# Patient Record
Sex: FEMALE | Race: WHITE | NOT HISPANIC OR LATINO
[De-identification: names, ages, dates, MRNs, and addresses within clinical notes are randomized per-mention and may not be internally consistent; named-entity substitution may affect disease eponyms.]

---

## 2018-12-31 PROBLEM — Z00.00 ENCOUNTER FOR PREVENTIVE HEALTH EXAMINATION: Status: ACTIVE | Noted: 2018-12-31

## 2019-01-09 ENCOUNTER — APPOINTMENT (OUTPATIENT)
Dept: MAMMOGRAPHY | Facility: CLINIC | Age: 64
End: 2019-01-09
Payer: COMMERCIAL

## 2019-01-09 ENCOUNTER — APPOINTMENT (OUTPATIENT)
Dept: ULTRASOUND IMAGING | Facility: CLINIC | Age: 64
End: 2019-01-09
Payer: COMMERCIAL

## 2019-01-09 ENCOUNTER — OUTPATIENT (OUTPATIENT)
Dept: OUTPATIENT SERVICES | Facility: HOSPITAL | Age: 64
LOS: 1 days | End: 2019-01-09
Payer: COMMERCIAL

## 2019-01-09 DIAGNOSIS — Z00.8 ENCOUNTER FOR OTHER GENERAL EXAMINATION: ICD-10-CM

## 2019-01-09 PROCEDURE — 77063 BREAST TOMOSYNTHESIS BI: CPT | Mod: 26

## 2019-01-09 PROCEDURE — 77067 SCR MAMMO BI INCL CAD: CPT | Mod: 26

## 2019-01-09 PROCEDURE — 76641 ULTRASOUND BREAST COMPLETE: CPT | Mod: 26,50

## 2019-01-09 PROCEDURE — 77063 BREAST TOMOSYNTHESIS BI: CPT

## 2019-01-09 PROCEDURE — 76641 ULTRASOUND BREAST COMPLETE: CPT

## 2019-01-09 PROCEDURE — 77067 SCR MAMMO BI INCL CAD: CPT

## 2021-11-30 ENCOUNTER — APPOINTMENT (OUTPATIENT)
Dept: RADIOLOGY | Facility: CLINIC | Age: 66
End: 2021-11-30
Payer: COMMERCIAL

## 2021-11-30 ENCOUNTER — OUTPATIENT (OUTPATIENT)
Dept: OUTPATIENT SERVICES | Facility: HOSPITAL | Age: 66
LOS: 1 days | End: 2021-11-30
Payer: COMMERCIAL

## 2021-11-30 ENCOUNTER — APPOINTMENT (OUTPATIENT)
Dept: MAMMOGRAPHY | Facility: CLINIC | Age: 66
End: 2021-11-30
Payer: COMMERCIAL

## 2021-11-30 ENCOUNTER — APPOINTMENT (OUTPATIENT)
Dept: ULTRASOUND IMAGING | Facility: CLINIC | Age: 66
End: 2021-11-30
Payer: COMMERCIAL

## 2021-11-30 DIAGNOSIS — Z00.00 ENCOUNTER FOR GENERAL ADULT MEDICAL EXAMINATION WITHOUT ABNORMAL FINDINGS: ICD-10-CM

## 2021-11-30 PROCEDURE — 77067 SCR MAMMO BI INCL CAD: CPT | Mod: 26

## 2021-11-30 PROCEDURE — 77063 BREAST TOMOSYNTHESIS BI: CPT

## 2021-11-30 PROCEDURE — 76641 ULTRASOUND BREAST COMPLETE: CPT | Mod: 26,RT

## 2021-11-30 PROCEDURE — 77080 DXA BONE DENSITY AXIAL: CPT

## 2021-11-30 PROCEDURE — 77063 BREAST TOMOSYNTHESIS BI: CPT | Mod: 26

## 2021-11-30 PROCEDURE — 77080 DXA BONE DENSITY AXIAL: CPT | Mod: 26

## 2021-11-30 PROCEDURE — 77067 SCR MAMMO BI INCL CAD: CPT

## 2021-11-30 PROCEDURE — 76641 ULTRASOUND BREAST COMPLETE: CPT

## 2022-02-24 ENCOUNTER — APPOINTMENT (OUTPATIENT)
Dept: ULTRASOUND IMAGING | Facility: CLINIC | Age: 67
End: 2022-02-24
Payer: MEDICARE

## 2022-02-24 PROCEDURE — 76536 US EXAM OF HEAD AND NECK: CPT

## 2022-03-15 ENCOUNTER — OUTPATIENT (OUTPATIENT)
Dept: OUTPATIENT SERVICES | Facility: HOSPITAL | Age: 67
LOS: 1 days | End: 2022-03-15
Payer: SELF-PAY

## 2022-03-15 DIAGNOSIS — E78.5 HYPERLIPIDEMIA, UNSPECIFIED: ICD-10-CM

## 2022-03-15 PROCEDURE — 75571 CT HRT W/O DYE W/CA TEST: CPT

## 2022-03-21 ENCOUNTER — APPOINTMENT (OUTPATIENT)
Dept: SURGERY | Facility: CLINIC | Age: 67
End: 2022-03-21
Payer: MEDICARE

## 2022-03-21 VITALS
BODY MASS INDEX: 23.92 KG/M2 | WEIGHT: 130 LBS | HEART RATE: 89 BPM | HEIGHT: 62 IN | SYSTOLIC BLOOD PRESSURE: 192 MMHG | DIASTOLIC BLOOD PRESSURE: 111 MMHG

## 2022-03-21 DIAGNOSIS — Z86.012 PERSONAL HISTORY OF BENIGN CARCINOID TUMOR: ICD-10-CM

## 2022-03-21 LAB
25(OH)D3 SERPL-MCNC: 32.2 NG/ML
CALCIUM SERPL-MCNC: 10.8 MG/DL
CALCIUM SERPL-MCNC: 10.8 MG/DL
PARATHYROID HORMONE INTACT: 59 PG/ML
T3 SERPL-MCNC: 140 NG/DL
T4 FREE SERPL-MCNC: 1.2 NG/DL
T4 SERPL-MCNC: 8.4 UG/DL
THYROGLOB AB SERPL-ACNC: <20 IU/ML
THYROPEROXIDASE AB SERPL IA-ACNC: 24 IU/ML
TSH SERPL-ACNC: 1.91 UIU/ML

## 2022-03-21 PROCEDURE — 99204 OFFICE O/P NEW MOD 45 MIN: CPT

## 2022-03-21 NOTE — PHYSICAL EXAM
[Midline] : located in midline position [Normal] : orientation to person, place, and time: normal [de-identified] : as above [de-identified] : Extremities: GONCALVES x 4.   Skin: No obvious skin lesions.   Voice: clear

## 2022-03-22 ENCOUNTER — NON-APPOINTMENT (OUTPATIENT)
Age: 67
End: 2022-03-22

## 2022-03-23 ENCOUNTER — APPOINTMENT (OUTPATIENT)
Dept: ULTRASOUND IMAGING | Facility: CLINIC | Age: 67
End: 2022-03-23
Payer: MEDICARE

## 2022-03-23 LAB — CA-I SERPL-SCNC: 5.6 MG/DL

## 2022-03-23 PROCEDURE — 76536 US EXAM OF HEAD AND NECK: CPT

## 2022-03-24 ENCOUNTER — NON-APPOINTMENT (OUTPATIENT)
Age: 67
End: 2022-03-24

## 2022-03-28 ENCOUNTER — NON-APPOINTMENT (OUTPATIENT)
Age: 67
End: 2022-03-28

## 2022-03-29 LAB
CAU: 7 MG/DL
CREAT 24H UR-MCNC: 0.9 G/24 H
CREAT ?TM UR-MCNC: 32 MG/DL
PROT ?TM UR-MCNC: 24 HR
SPECIMEN VOL 24H UR: 200 MG/24 H
SPECIMEN VOL 24H UR: 2850 ML

## 2022-04-08 DIAGNOSIS — R59.0 LOCALIZED ENLARGED LYMPH NODES: ICD-10-CM

## 2022-04-08 DIAGNOSIS — Z86.39 PERSONAL HISTORY OF OTHER ENDOCRINE, NUTRITIONAL AND METABOLIC DISEASE: ICD-10-CM

## 2022-04-14 ENCOUNTER — APPOINTMENT (OUTPATIENT)
Dept: SURGERY | Facility: CLINIC | Age: 67
End: 2022-04-14
Payer: MEDICARE

## 2022-04-14 PROCEDURE — 99213 OFFICE O/P EST LOW 20 MIN: CPT

## 2022-04-14 NOTE — PHYSICAL EXAM
[Midline] : located in midline position [Normal] : orientation to person, place, and time: normal [de-identified] : as above [de-identified] : Extremities: GONCALVES x 4.   Skin: No obvious skin lesions.   Voice: clear

## 2022-05-02 ENCOUNTER — APPOINTMENT (OUTPATIENT)
Dept: RADIOLOGY | Facility: CLINIC | Age: 67
End: 2022-05-02
Payer: MEDICARE

## 2022-05-02 PROCEDURE — 77080 DXA BONE DENSITY AXIAL: CPT

## 2022-06-30 ENCOUNTER — APPOINTMENT (OUTPATIENT)
Dept: SURGERY | Facility: CLINIC | Age: 67
End: 2022-06-30

## 2022-06-30 PROCEDURE — 99441: CPT

## 2022-08-17 ENCOUNTER — NON-APPOINTMENT (OUTPATIENT)
Age: 67
End: 2022-08-17

## 2022-09-28 ENCOUNTER — NON-APPOINTMENT (OUTPATIENT)
Age: 67
End: 2022-09-28

## 2022-09-29 ENCOUNTER — LABORATORY RESULT (OUTPATIENT)
Age: 67
End: 2022-09-29

## 2022-09-29 ENCOUNTER — APPOINTMENT (OUTPATIENT)
Dept: ULTRASOUND IMAGING | Facility: CLINIC | Age: 67
End: 2022-09-29

## 2022-09-29 PROCEDURE — 76536 US EXAM OF HEAD AND NECK: CPT

## 2022-10-03 ENCOUNTER — NON-APPOINTMENT (OUTPATIENT)
Age: 67
End: 2022-10-03

## 2022-10-12 VITALS
HEIGHT: 64 IN | HEART RATE: 63 BPM | SYSTOLIC BLOOD PRESSURE: 115 MMHG | DIASTOLIC BLOOD PRESSURE: 71 MMHG | WEIGHT: 195 LBS | BODY MASS INDEX: 33.29 KG/M2

## 2022-10-13 ENCOUNTER — APPOINTMENT (OUTPATIENT)
Dept: SURGERY | Facility: CLINIC | Age: 67
End: 2022-10-13

## 2022-10-13 PROCEDURE — 99213 OFFICE O/P EST LOW 20 MIN: CPT

## 2022-10-13 NOTE — PHYSICAL EXAM
[Midline] : located in midline position [Normal] : orientation to person, place, and time: normal [de-identified] : Extremities: GONCALVES x 4.   Skin: No obvious skin lesions.   Voice: clear

## 2022-11-08 ENCOUNTER — OUTPATIENT (OUTPATIENT)
Dept: OUTPATIENT SERVICES | Facility: HOSPITAL | Age: 67
LOS: 1 days | End: 2022-11-08
Payer: MEDICARE

## 2022-11-08 ENCOUNTER — APPOINTMENT (OUTPATIENT)
Dept: NUCLEAR MEDICINE | Facility: IMAGING CENTER | Age: 67
End: 2022-11-08

## 2022-11-08 DIAGNOSIS — Z00.8 ENCOUNTER FOR OTHER GENERAL EXAMINATION: ICD-10-CM

## 2022-11-08 DIAGNOSIS — E21.3 HYPERPARATHYROIDISM, UNSPECIFIED: ICD-10-CM

## 2022-11-08 PROCEDURE — 78072 PARATHYRD PLANAR W/SPECT&CT: CPT | Mod: 26

## 2022-11-08 PROCEDURE — 78072 PARATHYRD PLANAR W/SPECT&CT: CPT

## 2022-11-08 PROCEDURE — A9500: CPT

## 2022-11-08 PROCEDURE — A9512: CPT

## 2022-11-30 ENCOUNTER — APPOINTMENT (OUTPATIENT)
Dept: SURGERY | Facility: CLINIC | Age: 67
End: 2022-11-30

## 2022-11-30 PROCEDURE — 99214 OFFICE O/P EST MOD 30 MIN: CPT

## 2022-11-30 NOTE — PHYSICAL EXAM
[Midline] : located in midline position [Normal] : orientation to person, place, and time: normal [de-identified] : Extremities: GONCALVES x 4.   Skin: No obvious skin lesions.   Voice: clear

## 2023-01-13 ENCOUNTER — OUTPATIENT (OUTPATIENT)
Dept: OUTPATIENT SERVICES | Facility: HOSPITAL | Age: 68
LOS: 1 days | End: 2023-01-13
Payer: MEDICARE

## 2023-01-13 VITALS
TEMPERATURE: 98 F | HEART RATE: 90 BPM | SYSTOLIC BLOOD PRESSURE: 150 MMHG | RESPIRATION RATE: 16 BRPM | WEIGHT: 128.09 LBS | OXYGEN SATURATION: 99 % | DIASTOLIC BLOOD PRESSURE: 83 MMHG | HEIGHT: 62 IN

## 2023-01-13 DIAGNOSIS — Z90.710 ACQUIRED ABSENCE OF BOTH CERVIX AND UTERUS: Chronic | ICD-10-CM

## 2023-01-13 DIAGNOSIS — E21.3 HYPERPARATHYROIDISM, UNSPECIFIED: ICD-10-CM

## 2023-01-13 DIAGNOSIS — Z90.49 ACQUIRED ABSENCE OF OTHER SPECIFIED PARTS OF DIGESTIVE TRACT: Chronic | ICD-10-CM

## 2023-01-13 DIAGNOSIS — Z01.818 ENCOUNTER FOR OTHER PREPROCEDURAL EXAMINATION: ICD-10-CM

## 2023-01-13 DIAGNOSIS — Z86.39 PERSONAL HISTORY OF OTHER ENDOCRINE, NUTRITIONAL AND METABOLIC DISEASE: ICD-10-CM

## 2023-01-13 LAB
ALBUMIN SERPL ELPH-MCNC: 3.8 G/DL — SIGNIFICANT CHANGE UP (ref 3.3–5)
ALP SERPL-CCNC: 139 U/L — HIGH (ref 40–120)
ALT FLD-CCNC: 27 U/L — SIGNIFICANT CHANGE UP (ref 12–78)
ANION GAP SERPL CALC-SCNC: 7 MMOL/L — SIGNIFICANT CHANGE UP (ref 5–17)
APTT BLD: 29.2 SEC — SIGNIFICANT CHANGE UP (ref 27.5–35.5)
AST SERPL-CCNC: 23 U/L — SIGNIFICANT CHANGE UP (ref 15–37)
BILIRUB SERPL-MCNC: 0.3 MG/DL — SIGNIFICANT CHANGE UP (ref 0.2–1.2)
BUN SERPL-MCNC: 19 MG/DL — SIGNIFICANT CHANGE UP (ref 7–23)
CALCIUM SERPL-MCNC: 10.6 MG/DL — HIGH (ref 8.5–10.1)
CHLORIDE SERPL-SCNC: 106 MMOL/L — SIGNIFICANT CHANGE UP (ref 96–108)
CO2 SERPL-SCNC: 28 MMOL/L — SIGNIFICANT CHANGE UP (ref 22–31)
CREAT SERPL-MCNC: 0.75 MG/DL — SIGNIFICANT CHANGE UP (ref 0.5–1.3)
EGFR: 87 ML/MIN/1.73M2 — SIGNIFICANT CHANGE UP
GLUCOSE SERPL-MCNC: 98 MG/DL — SIGNIFICANT CHANGE UP (ref 70–99)
HCT VFR BLD CALC: 44.5 % — SIGNIFICANT CHANGE UP (ref 34.5–45)
HGB BLD-MCNC: 14.6 G/DL — SIGNIFICANT CHANGE UP (ref 11.5–15.5)
INR BLD: 0.97 RATIO — SIGNIFICANT CHANGE UP (ref 0.88–1.16)
MCHC RBC-ENTMCNC: 30.7 PG — SIGNIFICANT CHANGE UP (ref 27–34)
MCHC RBC-ENTMCNC: 32.8 GM/DL — SIGNIFICANT CHANGE UP (ref 32–36)
MCV RBC AUTO: 93.5 FL — SIGNIFICANT CHANGE UP (ref 80–100)
NRBC # BLD: 0 /100 WBCS — SIGNIFICANT CHANGE UP (ref 0–0)
PLATELET # BLD AUTO: 287 K/UL — SIGNIFICANT CHANGE UP (ref 150–400)
POTASSIUM SERPL-MCNC: 4.3 MMOL/L — SIGNIFICANT CHANGE UP (ref 3.5–5.3)
POTASSIUM SERPL-SCNC: 4.3 MMOL/L — SIGNIFICANT CHANGE UP (ref 3.5–5.3)
PROT SERPL-MCNC: 8.2 G/DL — SIGNIFICANT CHANGE UP (ref 6–8.3)
PROTHROM AB SERPL-ACNC: 11.3 SEC — SIGNIFICANT CHANGE UP (ref 10.5–13.4)
RBC # BLD: 4.76 M/UL — SIGNIFICANT CHANGE UP (ref 3.8–5.2)
RBC # FLD: 12.9 % — SIGNIFICANT CHANGE UP (ref 10.3–14.5)
SODIUM SERPL-SCNC: 141 MMOL/L — SIGNIFICANT CHANGE UP (ref 135–145)
WBC # BLD: 6.81 K/UL — SIGNIFICANT CHANGE UP (ref 3.8–10.5)
WBC # FLD AUTO: 6.81 K/UL — SIGNIFICANT CHANGE UP (ref 3.8–10.5)

## 2023-01-13 PROCEDURE — 93005 ELECTROCARDIOGRAM TRACING: CPT

## 2023-01-13 PROCEDURE — 85027 COMPLETE CBC AUTOMATED: CPT

## 2023-01-13 PROCEDURE — 93010 ELECTROCARDIOGRAM REPORT: CPT

## 2023-01-13 PROCEDURE — 36415 COLL VENOUS BLD VENIPUNCTURE: CPT

## 2023-01-13 PROCEDURE — G0463: CPT

## 2023-01-13 PROCEDURE — 85610 PROTHROMBIN TIME: CPT

## 2023-01-13 PROCEDURE — 86850 RBC ANTIBODY SCREEN: CPT

## 2023-01-13 PROCEDURE — 85730 THROMBOPLASTIN TIME PARTIAL: CPT

## 2023-01-13 PROCEDURE — 86901 BLOOD TYPING SEROLOGIC RH(D): CPT

## 2023-01-13 PROCEDURE — 80053 COMPREHEN METABOLIC PANEL: CPT

## 2023-01-13 PROCEDURE — 86900 BLOOD TYPING SEROLOGIC ABO: CPT

## 2023-01-13 NOTE — H&P PST ADULT - PROBLEM SELECTOR PLAN 1
check labs cbc cmp pt ptt type and screen  ekg  medical clearance  hibiclens x 3 days with written and verbal instructions  patient has an appointment at [facility] for covid test 48 hours before surgery  patient is aware that she may be monitored for sleep apnea for 3-6 hours post op  patient to take Amlodipine Olmesartan with a tiny sip water the morning of surgery  7 days before surgery stop the fish oil multivitamin Joint health aspirin and biotin  patient verbalizes understanding of instructions

## 2023-01-13 NOTE — H&P PST ADULT - ASSESSMENT
66 yo female with hx of Colon Carcinoid Hypertension Vitamin D Deficiency Hypercalcemia Hyperparathyroidism Multiple Thyroid Nodules Osteopenia scheduled for Parathyroidectomy with PTH Assay on 1/27/23 with Dr Pruitt.

## 2023-01-13 NOTE — H&P PST ADULT - NSICDXPASTMEDICALHX_GEN_ALL_CORE_FT
PAST MEDICAL HISTORY:  Carcinoid tumor of colon     H/O hyperparathyroidism     Hypertension     Osteopenia      PAST MEDICAL HISTORY:  Carcinoid tumor of colon     Cervical lymphadenopathy     H/O hyperparathyroidism     H/O vitamin D deficiency     Hypertension     Osteopenia

## 2023-01-13 NOTE — H&P PST ADULT - NSANTHOSAYNRD_GEN_A_CORE
No. NIYA screening performed.  STOP BANG Legend: 0-2 = LOW Risk; 3-4 = INTERMEDIATE Risk; 5-8 = HIGH Risk

## 2023-01-13 NOTE — H&P PST ADULT - HISTORY OF PRESENT ILLNESS
68 yo female with   Patient had hypercalcemia for the past year.  Patient has been monitored for one year and calcium stays elevated and developed Osteopenia.  66 yo female with hx of Colon Carcinoid Hypertension Vitamin D Deficiency Hypercalcemia Hyperparathyroidism Multiple Thyroid Nodules Osteopenia scheduled for Parathyroidectomy with PTH Assay on 1/27/23 with Dr Pruitt.  Patient has  had hypercalcemia for the past year, being monitored and calcium has increased.  Patient  developed Osteopenia.

## 2023-01-13 NOTE — H&P PST ADULT - PRIMARY CARE PROVIDER
[de-identified] : Presents for F/U from the ER.  Reviewed all ER documentation - pt had a sudden onset of generalized "shaking" with lethargy and since then is "not himself" - pt states has no appetite, no "drive," sleeping excessively.  Pt denies weakness on one side or another, just general weakness and fatigue, although states having difficulty sleeping.  Wife states "definitely off."
Dr Aleman

## 2023-01-13 NOTE — H&P PST ADULT - NSICDXFAMILYHX_GEN_ALL_CORE_FT
FAMILY HISTORY:  Father  Still living? No  Family history of CVA, Age at diagnosis: Age Unknown  FH: hypertension, Age at diagnosis: Age Unknown    Sibling  Still living? Yes, Estimated age: 72  FH: diabetes mellitus, Age at diagnosis: Age Unknown  FH: hypertension, Age at diagnosis: Age Unknown

## 2023-01-26 ENCOUNTER — TRANSCRIPTION ENCOUNTER (OUTPATIENT)
Age: 68
End: 2023-01-26

## 2023-01-26 NOTE — ASU PATIENT PROFILE, ADULT - NSICDXPASTMEDICALHX_GEN_ALL_CORE_FT
PAST MEDICAL HISTORY:  Carcinoid tumor of colon     Cervical lymphadenopathy     H/O hyperparathyroidism     H/O vitamin D deficiency     Hypertension     Osteopenia

## 2023-01-27 ENCOUNTER — TRANSCRIPTION ENCOUNTER (OUTPATIENT)
Age: 68
End: 2023-01-27

## 2023-01-27 ENCOUNTER — OUTPATIENT (OUTPATIENT)
Dept: OUTPATIENT SERVICES | Facility: HOSPITAL | Age: 68
LOS: 1 days | End: 2023-01-27
Payer: MEDICARE

## 2023-01-27 ENCOUNTER — RESULT REVIEW (OUTPATIENT)
Age: 68
End: 2023-01-27

## 2023-01-27 ENCOUNTER — APPOINTMENT (OUTPATIENT)
Dept: SURGERY | Facility: HOSPITAL | Age: 68
End: 2023-01-27

## 2023-01-27 VITALS
HEIGHT: 62 IN | DIASTOLIC BLOOD PRESSURE: 90 MMHG | OXYGEN SATURATION: 98 % | RESPIRATION RATE: 16 BRPM | TEMPERATURE: 98 F | HEART RATE: 100 BPM | SYSTOLIC BLOOD PRESSURE: 168 MMHG

## 2023-01-27 VITALS
HEART RATE: 95 BPM | DIASTOLIC BLOOD PRESSURE: 80 MMHG | SYSTOLIC BLOOD PRESSURE: 144 MMHG | RESPIRATION RATE: 17 BRPM | OXYGEN SATURATION: 98 %

## 2023-01-27 DIAGNOSIS — Z86.39 PERSONAL HISTORY OF OTHER ENDOCRINE, NUTRITIONAL AND METABOLIC DISEASE: ICD-10-CM

## 2023-01-27 DIAGNOSIS — Z90.49 ACQUIRED ABSENCE OF OTHER SPECIFIED PARTS OF DIGESTIVE TRACT: Chronic | ICD-10-CM

## 2023-01-27 DIAGNOSIS — Z90.710 ACQUIRED ABSENCE OF BOTH CERVIX AND UTERUS: Chronic | ICD-10-CM

## 2023-01-27 DIAGNOSIS — E21.3 HYPERPARATHYROIDISM, UNSPECIFIED: ICD-10-CM

## 2023-01-27 LAB — ABO RH CONFIRMATION: SIGNIFICANT CHANGE UP

## 2023-01-27 PROCEDURE — 60500 EXPLORE PARATHYROID GLANDS: CPT | Mod: AS

## 2023-01-27 PROCEDURE — 88305 TISSUE EXAM BY PATHOLOGIST: CPT

## 2023-01-27 PROCEDURE — 36415 COLL VENOUS BLD VENIPUNCTURE: CPT

## 2023-01-27 PROCEDURE — C1889: CPT

## 2023-01-27 PROCEDURE — 60500 EXPLORE PARATHYROID GLANDS: CPT

## 2023-01-27 PROCEDURE — 83970 ASSAY OF PARATHORMONE: CPT

## 2023-01-27 PROCEDURE — 88331 PATH CONSLTJ SURG 1 BLK 1SPC: CPT

## 2023-01-27 PROCEDURE — 88331 PATH CONSLTJ SURG 1 BLK 1SPC: CPT | Mod: 26

## 2023-01-27 PROCEDURE — 88305 TISSUE EXAM BY PATHOLOGIST: CPT | Mod: 26

## 2023-01-27 DEVICE — LIGATING CLIPS WECK HORIZON SMALL-WIDE (RED) 24: Type: IMPLANTABLE DEVICE | Status: FUNCTIONAL

## 2023-01-27 DEVICE — SURGICEL 2 X 14": Type: IMPLANTABLE DEVICE | Status: FUNCTIONAL

## 2023-01-27 DEVICE — TUBE EMG NIM TRIVANTAGE 7MM: Type: IMPLANTABLE DEVICE | Status: FUNCTIONAL

## 2023-01-27 RX ORDER — SODIUM CHLORIDE 9 MG/ML
1000 INJECTION, SOLUTION INTRAVENOUS
Refills: 0 | Status: DISCONTINUED | OUTPATIENT
Start: 2023-01-27 | End: 2023-01-27

## 2023-01-27 RX ORDER — OMEGA-3 ACID ETHYL ESTERS 1 G
1 CAPSULE ORAL
Qty: 0 | Refills: 0 | DISCHARGE

## 2023-01-27 RX ORDER — OXYCODONE AND ACETAMINOPHEN 5; 325 MG/1; MG/1
1 TABLET ORAL
Qty: 8 | Refills: 0
Start: 2023-01-27

## 2023-01-27 RX ORDER — ONDANSETRON 8 MG/1
4 TABLET, FILM COATED ORAL ONCE
Refills: 0 | Status: DISCONTINUED | OUTPATIENT
Start: 2023-01-27 | End: 2023-01-27

## 2023-01-27 RX ORDER — ASPIRIN/CALCIUM CARB/MAGNESIUM 324 MG
0 TABLET ORAL
Qty: 0 | Refills: 0 | DISCHARGE

## 2023-01-27 RX ORDER — HYDROMORPHONE HYDROCHLORIDE 2 MG/ML
0.5 INJECTION INTRAMUSCULAR; INTRAVENOUS; SUBCUTANEOUS
Refills: 0 | Status: DISCONTINUED | OUTPATIENT
Start: 2023-01-27 | End: 2023-01-27

## 2023-01-27 RX ORDER — AMLODIPINE BESYLATE AND OLMESARTRAN MEDOXOMIL 10; 40 MG/1; MG/1
1 TABLET, FILM COATED ORAL
Qty: 0 | Refills: 0 | DISCHARGE

## 2023-01-27 RX ORDER — CHOLECALCIFEROL (VITAMIN D3) 125 MCG
0 CAPSULE ORAL
Qty: 0 | Refills: 0 | DISCHARGE

## 2023-01-27 RX ORDER — PREGABALIN 225 MG/1
0 CAPSULE ORAL
Qty: 0 | Refills: 0 | DISCHARGE

## 2023-01-27 RX ORDER — HYDROMORPHONE HYDROCHLORIDE 2 MG/ML
1 INJECTION INTRAMUSCULAR; INTRAVENOUS; SUBCUTANEOUS
Refills: 0 | Status: DISCONTINUED | OUTPATIENT
Start: 2023-01-27 | End: 2023-01-27

## 2023-01-27 RX ORDER — CEFAZOLIN SODIUM 1 G
2000 VIAL (EA) INJECTION ONCE
Refills: 0 | Status: COMPLETED | OUTPATIENT
Start: 2023-01-27 | End: 2023-01-27

## 2023-01-27 RX ADMIN — Medication 1 TABLET(S): at 12:10

## 2023-01-27 RX ADMIN — SODIUM CHLORIDE 75 MILLILITER(S): 9 INJECTION, SOLUTION INTRAVENOUS at 11:09

## 2023-01-27 NOTE — ASU DISCHARGE PLAN (ADULT/PEDIATRIC) - ASU DC SPECIAL INSTRUCTIONSFT
- Leave Steri-strips (tapes) on.  Some blood staining on the tape is normal.    - Okay to shower 48 hours after surgery ([place] morning).  Keep showers short.  No baths or soaking the incision.  Remember to gently towel dry over the incision to avoid rubbing off the Steri-strips.    - Start gentle neck exercises after 72 hours (Monday morning).  Look all the way to the right and left and then let your head roll all the way around.  Do this 10 times in a row at least 3 times a day.    - You may take Tylenol or Percocet for pain.  After 24 hours it is okay to take Ibuprofen.    - Remember that it is expected that your calcium level may be low after parathyroidectomy.  You should take around 500 mg of over-the-counter calcium TWO TIMES DAILY starting tonight.  You may take any supplement that has 500-600 mg elemental calcium per pill.  If you begin experiencing symptoms of low calcium, such as numbness or tingling in your fingertips or around your mouth, immediately take an extra 1000 mg of elemental calcium.    - Please call Dr. Mccrary’s office ([phone]) to schedule a follow-up appointment for either Monday, ([place]) or Thursday, ([place]).

## 2023-01-27 NOTE — BRIEF OPERATIVE NOTE - NSICDXBRIEFPROCEDURE_GEN_ALL_CORE_FT
PROCEDURES:  Parathyroidectomy with nerve integrity monitoring 27-Jan-2023 11:07:13  Alicia Rodriguez

## 2023-01-27 NOTE — ASU DISCHARGE PLAN (ADULT/PEDIATRIC) - CARE PROVIDER_API CALL
Scott Mccrary  General Surgery  [address] [place] [address]  Phone: [phone]  Fax: [phone]  Follow Up Time:

## 2023-01-27 NOTE — ASU DISCHARGE PLAN (ADULT/PEDIATRIC) - NS MD DC FALL RISK RISK
For information on Fall & Injury Prevention, visit: [email or web address] OR  [email or web address] OR  https://www.cdc.gov/steadi/patient.html

## 2023-02-02 ENCOUNTER — APPOINTMENT (OUTPATIENT)
Dept: SURGERY | Facility: CLINIC | Age: 68
End: 2023-02-02
Payer: MEDICARE

## 2023-02-02 VITALS — SYSTOLIC BLOOD PRESSURE: 165 MMHG | DIASTOLIC BLOOD PRESSURE: 79 MMHG | HEART RATE: 74 BPM | OXYGEN SATURATION: 98 %

## 2023-02-02 PROBLEM — R59.0 LOCALIZED ENLARGED LYMPH NODES: Chronic | Status: ACTIVE | Noted: 2023-01-13

## 2023-02-02 PROBLEM — M85.80 OTHER SPECIFIED DISORDERS OF BONE DENSITY AND STRUCTURE, UNSPECIFIED SITE: Chronic | Status: ACTIVE | Noted: 2023-01-13

## 2023-02-02 PROBLEM — Z86.39 PERSONAL HISTORY OF OTHER ENDOCRINE, NUTRITIONAL AND METABOLIC DISEASE: Chronic | Status: ACTIVE | Noted: 2023-01-13

## 2023-02-02 PROBLEM — I10 ESSENTIAL (PRIMARY) HYPERTENSION: Chronic | Status: ACTIVE | Noted: 2023-01-13

## 2023-02-02 PROCEDURE — 99024 POSTOP FOLLOW-UP VISIT: CPT

## 2023-02-02 NOTE — PHYSICAL EXAM
[Midline] : located in midline position [Normal] : orientation to person, place, and time: normal [de-identified] : Extremities: GONCALVES x 4.   Skin: No obvious skin lesions.   Voice: clear

## 2023-03-09 ENCOUNTER — NON-APPOINTMENT (OUTPATIENT)
Age: 68
End: 2023-03-09

## 2023-03-10 ENCOUNTER — NON-APPOINTMENT (OUTPATIENT)
Age: 68
End: 2023-03-10

## 2023-03-10 ENCOUNTER — LABORATORY RESULT (OUTPATIENT)
Age: 68
End: 2023-03-10

## 2023-03-11 ENCOUNTER — NON-APPOINTMENT (OUTPATIENT)
Age: 68
End: 2023-03-11

## 2023-03-23 ENCOUNTER — APPOINTMENT (OUTPATIENT)
Dept: SURGERY | Facility: CLINIC | Age: 68
End: 2023-03-23
Payer: MEDICARE

## 2023-03-23 PROCEDURE — 99024 POSTOP FOLLOW-UP VISIT: CPT

## 2023-03-23 NOTE — REASON FOR VISIT
[Follow-Up: _____] : a [unfilled] follow-up visit [Verbal consent obtained from patient] : the patient, [unfilled]

## 2023-05-02 ENCOUNTER — OUTPATIENT (OUTPATIENT)
Dept: OUTPATIENT SERVICES | Facility: HOSPITAL | Age: 68
LOS: 1 days | Discharge: HOME | End: 2023-05-02
Payer: MEDICARE

## 2023-05-02 ENCOUNTER — NON-APPOINTMENT (OUTPATIENT)
Age: 68
End: 2023-05-02

## 2023-05-02 DIAGNOSIS — Z90.49 ACQUIRED ABSENCE OF OTHER SPECIFIED PARTS OF DIGESTIVE TRACT: Chronic | ICD-10-CM

## 2023-05-02 DIAGNOSIS — C18.9 MALIGNANT NEOPLASM OF COLON, UNSPECIFIED: ICD-10-CM

## 2023-05-02 DIAGNOSIS — Z90.710 ACQUIRED ABSENCE OF BOTH CERVIX AND UTERUS: Chronic | ICD-10-CM

## 2023-05-19 ENCOUNTER — RESULT REVIEW (OUTPATIENT)
Age: 68
End: 2023-05-19

## 2023-05-19 PROCEDURE — 88321 CONSLTJ&REPRT SLD PREP ELSWR: CPT

## 2023-05-22 ENCOUNTER — NON-APPOINTMENT (OUTPATIENT)
Age: 68
End: 2023-05-22

## 2023-05-22 ENCOUNTER — APPOINTMENT (OUTPATIENT)
Dept: HEMATOLOGY ONCOLOGY | Facility: CLINIC | Age: 68
End: 2023-05-22
Payer: MEDICARE

## 2023-05-22 VITALS
HEART RATE: 88 BPM | TEMPERATURE: 97.7 F | RESPIRATION RATE: 16 BRPM | BODY MASS INDEX: 24.03 KG/M2 | HEIGHT: 62.32 IN | WEIGHT: 132.28 LBS | DIASTOLIC BLOOD PRESSURE: 83 MMHG | SYSTOLIC BLOOD PRESSURE: 154 MMHG | OXYGEN SATURATION: 98 %

## 2023-05-22 DIAGNOSIS — I10 ESSENTIAL (PRIMARY) HYPERTENSION: ICD-10-CM

## 2023-05-22 DIAGNOSIS — Z78.9 OTHER SPECIFIED HEALTH STATUS: ICD-10-CM

## 2023-05-22 DIAGNOSIS — Z80.3 FAMILY HISTORY OF MALIGNANT NEOPLASM OF BREAST: ICD-10-CM

## 2023-05-22 PROCEDURE — 99205 OFFICE O/P NEW HI 60 MIN: CPT

## 2023-05-24 PROBLEM — I10 HYPERTENSION, UNSPECIFIED TYPE: Status: ACTIVE | Noted: 2022-03-21

## 2023-05-24 PROBLEM — Z78.9 NON-SMOKER: Status: ACTIVE | Noted: 2023-05-24

## 2023-05-24 PROBLEM — Z80.3 FAMILY HISTORY OF MALIGNANT NEOPLASM OF BREAST: Status: ACTIVE | Noted: 2023-05-24

## 2023-05-24 RX ORDER — ELECTROLYTES/DEXTROSE
SOLUTION, ORAL ORAL
Refills: 0 | Status: ACTIVE | COMMUNITY

## 2023-05-24 RX ORDER — AMLODIPINE AND OLMESARTAN MEDOXOMIL 5; 20 MG/1; MG/1
5-20 TABLET ORAL
Refills: 0 | Status: ACTIVE | COMMUNITY

## 2023-05-24 RX ORDER — CETIRIZINE HCL 10 MG
TABLET ORAL
Refills: 0 | Status: ACTIVE | COMMUNITY

## 2023-05-24 RX ORDER — ACETAMINOPHEN 325 MG
TABLET ORAL
Refills: 0 | Status: ACTIVE | COMMUNITY

## 2023-05-24 RX ORDER — ASPIRIN 81 MG
81 TABLET, DELAYED RELEASE (ENTERIC COATED) ORAL
Refills: 0 | Status: ACTIVE | COMMUNITY

## 2023-05-24 NOTE — HISTORY OF PRESENT ILLNESS
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

## 2023-05-24 NOTE — REASON FOR VISIT
[Initial Consultation] : an initial consultation [Spouse] : spouse [FreeTextEntry2] : neuroendocrine tumor and LAMN

## 2023-05-24 NOTE — CONSULT LETTER
[Dear  ___] : Dear  [unfilled], [Consult Letter:] : I had the pleasure of evaluating your patient, [unfilled]. [Please see my note below.] : Please see my note below. [Consult Closing:] : Thank you very much for allowing me to participate in the care of this patient.  If you have any questions, please do not hesitate to contact me. [Sincerely,] : Sincerely, [FreeTextEntry3] : Ranjan Contreras MD, FACP \par  of Medicine \par Division of Hematology/Oncology\par [facility]\par [facility] \par [address]\[place] [address]\par Tel: [phone]\par Fax: [phone]\par \par \par

## 2023-05-24 NOTE — PHYSICAL EXAM
[Fully active, able to carry on all pre-disease performance without restriction] : Status 0 - Fully active, able to carry on all pre-disease performance without restriction [Normal] : affect appropriate [de-identified] : anicteric

## 2023-05-31 ENCOUNTER — APPOINTMENT (OUTPATIENT)
Dept: CT IMAGING | Facility: CLINIC | Age: 68
End: 2023-05-31
Payer: MEDICARE

## 2023-05-31 ENCOUNTER — OUTPATIENT (OUTPATIENT)
Dept: OUTPATIENT SERVICES | Facility: HOSPITAL | Age: 68
LOS: 1 days | End: 2023-05-31
Payer: MEDICARE

## 2023-05-31 DIAGNOSIS — Z90.710 ACQUIRED ABSENCE OF BOTH CERVIX AND UTERUS: Chronic | ICD-10-CM

## 2023-05-31 DIAGNOSIS — Z00.8 ENCOUNTER FOR OTHER GENERAL EXAMINATION: ICD-10-CM

## 2023-05-31 DIAGNOSIS — Z90.49 ACQUIRED ABSENCE OF OTHER SPECIFIED PARTS OF DIGESTIVE TRACT: Chronic | ICD-10-CM

## 2023-05-31 LAB — SURGICAL PATHOLOGY STUDY: SIGNIFICANT CHANGE UP

## 2023-05-31 PROCEDURE — 71260 CT THORAX DX C+: CPT | Mod: 26

## 2023-05-31 PROCEDURE — 71260 CT THORAX DX C+: CPT

## 2023-05-31 PROCEDURE — 74177 CT ABD & PELVIS W/CONTRAST: CPT | Mod: 26

## 2023-05-31 PROCEDURE — 74177 CT ABD & PELVIS W/CONTRAST: CPT

## 2023-06-21 ENCOUNTER — APPOINTMENT (OUTPATIENT)
Dept: NUCLEAR MEDICINE | Facility: IMAGING CENTER | Age: 68
End: 2023-06-21
Payer: MEDICARE

## 2023-06-21 ENCOUNTER — OUTPATIENT (OUTPATIENT)
Dept: OUTPATIENT SERVICES | Facility: HOSPITAL | Age: 68
LOS: 1 days | End: 2023-06-21
Payer: MEDICARE

## 2023-06-21 DIAGNOSIS — Z90.49 ACQUIRED ABSENCE OF OTHER SPECIFIED PARTS OF DIGESTIVE TRACT: Chronic | ICD-10-CM

## 2023-06-21 DIAGNOSIS — Z00.8 ENCOUNTER FOR OTHER GENERAL EXAMINATION: ICD-10-CM

## 2023-06-21 DIAGNOSIS — Z90.710 ACQUIRED ABSENCE OF BOTH CERVIX AND UTERUS: Chronic | ICD-10-CM

## 2023-06-21 DIAGNOSIS — D3A.8 OTHER BENIGN NEUROENDOCRINE TUMORS: ICD-10-CM

## 2023-06-21 PROCEDURE — 78815 PET IMAGE W/CT SKULL-THIGH: CPT | Mod: 26,PI

## 2023-06-21 PROCEDURE — A9592: CPT

## 2023-06-21 PROCEDURE — 78815 PET IMAGE W/CT SKULL-THIGH: CPT

## 2023-06-26 ENCOUNTER — OUTPATIENT (OUTPATIENT)
Dept: OUTPATIENT SERVICES | Facility: HOSPITAL | Age: 68
LOS: 1 days | Discharge: HOME | End: 2023-06-26

## 2023-06-26 DIAGNOSIS — C18.9 MALIGNANT NEOPLASM OF COLON, UNSPECIFIED: ICD-10-CM

## 2023-06-26 DIAGNOSIS — Z90.49 ACQUIRED ABSENCE OF OTHER SPECIFIED PARTS OF DIGESTIVE TRACT: Chronic | ICD-10-CM

## 2023-06-26 DIAGNOSIS — Z90.710 ACQUIRED ABSENCE OF BOTH CERVIX AND UTERUS: Chronic | ICD-10-CM

## 2023-07-05 ENCOUNTER — RESULT REVIEW (OUTPATIENT)
Age: 68
End: 2023-07-05

## 2023-07-05 ENCOUNTER — APPOINTMENT (OUTPATIENT)
Dept: HEMATOLOGY ONCOLOGY | Facility: CLINIC | Age: 68
End: 2023-07-05
Payer: MEDICARE

## 2023-07-05 VITALS
OXYGEN SATURATION: 98 % | RESPIRATION RATE: 16 BRPM | TEMPERATURE: 97.2 F | WEIGHT: 131.83 LBS | DIASTOLIC BLOOD PRESSURE: 86 MMHG | BODY MASS INDEX: 23.86 KG/M2 | HEART RATE: 80 BPM | SYSTOLIC BLOOD PRESSURE: 133 MMHG

## 2023-07-05 LAB
ALBUMIN SERPL ELPH-MCNC: 4.6 G/DL
ALP BLD-CCNC: 104 U/L
ALT SERPL-CCNC: 21 U/L
ANION GAP SERPL CALC-SCNC: 12 MMOL/L
AST SERPL-CCNC: 31 U/L
BASOPHILS # BLD AUTO: 0.03 K/UL — SIGNIFICANT CHANGE UP (ref 0–0.2)
BASOPHILS NFR BLD AUTO: 0.4 % — SIGNIFICANT CHANGE UP (ref 0–2)
BILIRUB SERPL-MCNC: 0.4 MG/DL
BUN SERPL-MCNC: 14 MG/DL
CALCIUM SERPL-MCNC: 9.5 MG/DL
CHLORIDE SERPL-SCNC: 100 MMOL/L
CO2 SERPL-SCNC: 26 MMOL/L
CREAT SERPL-MCNC: 0.65 MG/DL
EGFR: 96 ML/MIN/1.73M2
EOSINOPHIL # BLD AUTO: 0.11 K/UL — SIGNIFICANT CHANGE UP (ref 0–0.5)
EOSINOPHIL NFR BLD AUTO: 1.6 % — SIGNIFICANT CHANGE UP (ref 0–6)
GLUCOSE SERPL-MCNC: 109 MG/DL
HCT VFR BLD CALC: 43.8 % — SIGNIFICANT CHANGE UP (ref 34.5–45)
HGB BLD-MCNC: 14.6 G/DL — SIGNIFICANT CHANGE UP (ref 11.5–15.5)
IMM GRANULOCYTES NFR BLD AUTO: 0.4 % — SIGNIFICANT CHANGE UP (ref 0–0.9)
LYMPHOCYTES # BLD AUTO: 2.55 K/UL — SIGNIFICANT CHANGE UP (ref 1–3.3)
LYMPHOCYTES # BLD AUTO: 37.7 % — SIGNIFICANT CHANGE UP (ref 13–44)
MCHC RBC-ENTMCNC: 30.7 PG — SIGNIFICANT CHANGE UP (ref 27–34)
MCHC RBC-ENTMCNC: 33.3 G/DL — SIGNIFICANT CHANGE UP (ref 32–36)
MCV RBC AUTO: 92.2 FL — SIGNIFICANT CHANGE UP (ref 80–100)
MONOCYTES # BLD AUTO: 0.73 K/UL — SIGNIFICANT CHANGE UP (ref 0–0.9)
MONOCYTES NFR BLD AUTO: 10.8 % — SIGNIFICANT CHANGE UP (ref 2–14)
NEUTROPHILS # BLD AUTO: 3.31 K/UL — SIGNIFICANT CHANGE UP (ref 1.8–7.4)
NEUTROPHILS NFR BLD AUTO: 49.1 % — SIGNIFICANT CHANGE UP (ref 43–77)
NRBC # BLD: 0 /100 WBCS — SIGNIFICANT CHANGE UP (ref 0–0)
PLATELET # BLD AUTO: 254 K/UL — SIGNIFICANT CHANGE UP (ref 150–400)
POTASSIUM SERPL-SCNC: 4.6 MMOL/L
PROT SERPL-MCNC: 7.5 G/DL
RBC # BLD: 4.75 M/UL — SIGNIFICANT CHANGE UP (ref 3.8–5.2)
RBC # FLD: 13.1 % — SIGNIFICANT CHANGE UP (ref 10.3–14.5)
SODIUM SERPL-SCNC: 139 MMOL/L
WBC # BLD: 6.76 K/UL — SIGNIFICANT CHANGE UP (ref 3.8–10.5)
WBC # FLD AUTO: 6.76 K/UL — SIGNIFICANT CHANGE UP (ref 3.8–10.5)

## 2023-07-05 PROCEDURE — 99215 OFFICE O/P EST HI 40 MIN: CPT

## 2023-07-06 NOTE — HISTORY OF PRESENT ILLNESS
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

## 2023-07-06 NOTE — PHYSICAL EXAM
[Fully active, able to carry on all pre-disease performance without restriction] : Status 0 - Fully active, able to carry on all pre-disease performance without restriction [Normal] : affect appropriate [de-identified] : anicteric  [de-identified] : normal respiratory effort, no audible wheeze [de-identified] : reg rate

## 2023-07-11 LAB — CGA SERPL-MCNC: 375 NG/ML

## 2023-07-14 ENCOUNTER — APPOINTMENT (OUTPATIENT)
Dept: MRI IMAGING | Facility: CLINIC | Age: 68
End: 2023-07-14
Payer: MEDICARE

## 2023-07-14 PROCEDURE — A9581: CPT

## 2023-07-14 PROCEDURE — 74183 MRI ABD W/O CNTR FLWD CNTR: CPT

## 2023-07-19 LAB
MISCELLANEOUS TEST: NORMAL
PROC NAME: NORMAL

## 2023-07-28 ENCOUNTER — APPOINTMENT (OUTPATIENT)
Dept: HEMATOLOGY ONCOLOGY | Facility: CLINIC | Age: 68
End: 2023-07-28
Payer: MEDICARE

## 2023-07-28 VITALS
RESPIRATION RATE: 16 BRPM | WEIGHT: 131.61 LBS | BODY MASS INDEX: 23.82 KG/M2 | DIASTOLIC BLOOD PRESSURE: 90 MMHG | HEART RATE: 79 BPM | OXYGEN SATURATION: 96 % | TEMPERATURE: 97.1 F | SYSTOLIC BLOOD PRESSURE: 136 MMHG

## 2023-07-28 DIAGNOSIS — Z09 ENCOUNTER FOR FOLLOW-UP EXAMINATION AFTER COMPLETED TREATMENT FOR CONDITIONS OTHER THAN MALIGNANT NEOPLASM: ICD-10-CM

## 2023-07-28 PROCEDURE — 99213 OFFICE O/P EST LOW 20 MIN: CPT

## 2023-07-28 NOTE — PHYSICAL EXAM
[Fully active, able to carry on all pre-disease performance without restriction] : Status 0 - Fully active, able to carry on all pre-disease performance without restriction [Normal] : affect appropriate [de-identified] : anicteric  [de-identified] : normal respiratory effort, no audible wheeze [de-identified] : reg rate

## 2023-07-28 NOTE — HISTORY OF PRESENT ILLNESS
[Disease: _____________________] : Disease: [unfilled] [AJCC Stage: ____] : AJCC Stage: [unfilled] [de-identified] : SUKH also noted  [de-identified] : in 2015 presented to [place] with lower quadrant pain , nothing noted, placed on surveillance and in March 2016 had appendiceal dilation leading to surgery which revealed :\par -LAMN with negative margins and no mucin outside the wall \par -small bowel had low to intermediate grade NE tumor Ki 10% and 2% , 6/17 nodes were + for disease\par \par [place] [facility] [place] surveillance and was OLENA \par in 2020 last imaging there was OLENA \par 2023 transferred care to [facility]   \par May 2023 - June 2023 imaging revealed metastatic disease to liver bilobar  [de-identified] : [place] [facility] [place]

## 2023-09-30 ENCOUNTER — APPOINTMENT (OUTPATIENT)
Dept: CT IMAGING | Facility: CLINIC | Age: 68
End: 2023-09-30
Payer: MEDICARE

## 2023-09-30 ENCOUNTER — RESULT REVIEW (OUTPATIENT)
Age: 68
End: 2023-09-30

## 2023-09-30 ENCOUNTER — OUTPATIENT (OUTPATIENT)
Dept: OUTPATIENT SERVICES | Facility: HOSPITAL | Age: 68
LOS: 1 days | End: 2023-09-30
Payer: MEDICARE

## 2023-09-30 DIAGNOSIS — Z90.49 ACQUIRED ABSENCE OF OTHER SPECIFIED PARTS OF DIGESTIVE TRACT: Chronic | ICD-10-CM

## 2023-09-30 DIAGNOSIS — D3A.8 OTHER BENIGN NEUROENDOCRINE TUMORS: ICD-10-CM

## 2023-09-30 DIAGNOSIS — Z90.710 ACQUIRED ABSENCE OF BOTH CERVIX AND UTERUS: Chronic | ICD-10-CM

## 2023-09-30 PROCEDURE — 74177 CT ABD & PELVIS W/CONTRAST: CPT | Mod: 26

## 2023-09-30 PROCEDURE — 71260 CT THORAX DX C+: CPT

## 2023-09-30 PROCEDURE — 74177 CT ABD & PELVIS W/CONTRAST: CPT

## 2023-09-30 PROCEDURE — 71260 CT THORAX DX C+: CPT | Mod: 26

## 2023-10-18 ENCOUNTER — OUTPATIENT (OUTPATIENT)
Dept: OUTPATIENT SERVICES | Facility: HOSPITAL | Age: 68
LOS: 1 days | Discharge: HOME | End: 2023-10-18

## 2023-10-18 DIAGNOSIS — Z90.710 ACQUIRED ABSENCE OF BOTH CERVIX AND UTERUS: Chronic | ICD-10-CM

## 2023-10-18 DIAGNOSIS — C18.9 MALIGNANT NEOPLASM OF COLON, UNSPECIFIED: ICD-10-CM

## 2023-10-18 DIAGNOSIS — Z90.49 ACQUIRED ABSENCE OF OTHER SPECIFIED PARTS OF DIGESTIVE TRACT: Chronic | ICD-10-CM

## 2023-10-20 ENCOUNTER — APPOINTMENT (OUTPATIENT)
Dept: HEMATOLOGY ONCOLOGY | Facility: CLINIC | Age: 68
End: 2023-10-20
Payer: MEDICARE

## 2023-10-20 VITALS
HEART RATE: 80 BPM | HEIGHT: 62.17 IN | OXYGEN SATURATION: 97 % | TEMPERATURE: 96.6 F | BODY MASS INDEX: 23.39 KG/M2 | DIASTOLIC BLOOD PRESSURE: 84 MMHG | SYSTOLIC BLOOD PRESSURE: 137 MMHG | WEIGHT: 128.75 LBS | RESPIRATION RATE: 16 BRPM

## 2023-10-20 PROCEDURE — 99213 OFFICE O/P EST LOW 20 MIN: CPT

## 2023-11-01 ENCOUNTER — NON-APPOINTMENT (OUTPATIENT)
Age: 68
End: 2023-11-01

## 2023-11-14 ENCOUNTER — APPOINTMENT (OUTPATIENT)
Dept: PEDIATRIC ALLERGY IMMUNOLOGY | Facility: CLINIC | Age: 68
End: 2023-11-14
Payer: MEDICARE

## 2023-11-14 VITALS — OXYGEN SATURATION: 98 % | SYSTOLIC BLOOD PRESSURE: 145 MMHG | DIASTOLIC BLOOD PRESSURE: 90 MMHG | HEART RATE: 79 BPM

## 2023-11-14 PROCEDURE — 99203 OFFICE O/P NEW LOW 30 MIN: CPT

## 2023-11-27 ENCOUNTER — NON-APPOINTMENT (OUTPATIENT)
Age: 68
End: 2023-11-27

## 2023-12-06 ENCOUNTER — RX CHANGE (OUTPATIENT)
Age: 68
End: 2023-12-06

## 2023-12-08 ENCOUNTER — APPOINTMENT (OUTPATIENT)
Dept: ULTRASOUND IMAGING | Facility: CLINIC | Age: 68
End: 2023-12-08
Payer: MEDICARE

## 2023-12-08 PROCEDURE — 76536 US EXAM OF HEAD AND NECK: CPT

## 2023-12-27 ENCOUNTER — APPOINTMENT (OUTPATIENT)
Dept: SURGERY | Facility: CLINIC | Age: 68
End: 2023-12-27
Payer: MEDICARE

## 2023-12-27 DIAGNOSIS — E04.2 NONTOXIC MULTINODULAR GOITER: ICD-10-CM

## 2023-12-27 DIAGNOSIS — M85.80 OTHER SPECIFIED DISORDERS OF BONE DENSITY AND STRUCTURE, UNSPECIFIED SITE: ICD-10-CM

## 2023-12-27 DIAGNOSIS — E89.2 POSTPROCEDURAL HYPOPARATHYROIDISM: ICD-10-CM

## 2023-12-27 PROCEDURE — 99213 OFFICE O/P EST LOW 20 MIN: CPT

## 2023-12-27 NOTE — CONSULT LETTER
[Dear  ___] : Dear  [unfilled], [FreeTextEntry1] : I would like to update you on your patient, Loly Jones.  Please see my full office note below and feel free to contact me with any questions.  Thank you for allowing me to participate in the care of your patient.  Sincerely,  Scott Mccrary MD, FACS Chief of Endocrine Surgery, [facility] Assistant Professor of Surgery, [employer] Division of Endocrine Surgery, Department of Surgery [address] [address] [phone] [email or web address]

## 2023-12-27 NOTE — HISTORY OF PRESENT ILLNESS
[de-identified] : Mrs. Jones presents for follow-up.  Her recent (approximately 1-year, 3-month follow-up), neck ultrasound, performed 12/08/2023 ([facility]), reported a right mid to upper posterior 0.7 cm hypoechoic solid thyroid nodule, a left mid posterior 0.5 cm hypoechoic solid thyroid nodule, and "additional smaller bilateral thyroid nodules and cysts."  There was no suspicious lymphadenopathy appreciated.  When compared to her prior ultrasounds, her reported thyroid nodule have remained stable.  Labs performed 07/05/2023 revealed serum calcium = 9.5.  She states that she has been feeling well and has been taking approximately 500 mg of calcium daily.

## 2023-12-27 NOTE — PHYSICAL EXAM
[Midline] : located in midline position [Normal] : orientation to person, place, and time: normal [de-identified] : The neck appears flat.  Her scar is flat and has faded within a mid anterior  neck skin crease. [de-identified] : Extremities: GONCALVES x 4.   Skin: No obvious skin lesions.   Voice: clear

## 2023-12-27 NOTE — ASSESSMENT
[FreeTextEntry1] : Assessment:  68-year-old woman, with history significant for a left inferior parathyroidectomy and osteopenia, with stable bilateral subcentimeter thyroid nodules.  Plan: - The recent follow-up neck ultrasound was first reviewed with Mrs. Jones and her  and I explained that when compared to her prior neck ultrasounds, her reported thyroid nodule have remained stable. - The indications for biopsy and thyroidectomy were then reviewed and I reiterated that a biopsy is recommended for solid thyroid nodules > 1 cm in diameter.  Based upon these guidelines, I explained that her subcentimeter thyroid nodules still do not require a biopsy.  Considering that her thyroid nodules have remained stable over the past 1 year, 9 months, I have recommended continued observation with a repeat neck ultrasound in 2 years (December, 2025). - I then reassured her that her serum calcium was normal when checked at six-months post-op and have recommended that she remain on daily calcium supplementation for her osteopenia. - The patient expressed understanding of the above agrees with this plan.  She was instructed to follow-up PRN or in 2 years.  Will arrange for her to have a follow-up ultrasound prior to her appointment such that we can review the results and further management at that time.

## 2024-01-02 ENCOUNTER — RX CHANGE (OUTPATIENT)
Age: 69
End: 2024-01-02

## 2024-01-30 ENCOUNTER — RX CHANGE (OUTPATIENT)
Age: 69
End: 2024-01-30

## 2024-01-30 ENCOUNTER — APPOINTMENT (OUTPATIENT)
Dept: PEDIATRIC ALLERGY IMMUNOLOGY | Facility: CLINIC | Age: 69
End: 2024-01-30
Payer: MEDICARE

## 2024-01-30 DIAGNOSIS — J31.0 CHRONIC RHINITIS: ICD-10-CM

## 2024-01-30 DIAGNOSIS — R09.82 POSTNASAL DRIP: ICD-10-CM

## 2024-01-30 PROCEDURE — 99213 OFFICE O/P EST LOW 20 MIN: CPT | Mod: 25

## 2024-01-30 PROCEDURE — 95004 PERQ TESTS W/ALRGNC XTRCS: CPT

## 2024-01-30 RX ORDER — AZELASTINE HYDROCHLORIDE 137 UG/1
0.1 SPRAY, METERED NASAL TWICE DAILY
Qty: 3 | Refills: 1 | Status: ACTIVE | COMMUNITY
Start: 2023-11-14 | End: 1900-01-01

## 2024-01-30 RX ORDER — MAGNESIUM OXIDE/MAG AA CHELATE 300 MG
CAPSULE ORAL
Refills: 0 | Status: ACTIVE | COMMUNITY

## 2024-01-30 RX ORDER — PNV NO.95/FERROUS FUM/FOLIC AC 28MG-0.8MG
TABLET ORAL
Refills: 0 | Status: ACTIVE | COMMUNITY

## 2024-01-30 RX ORDER — POTASSIUM 75 MG
TABLET ORAL
Refills: 0 | Status: ACTIVE | COMMUNITY

## 2024-01-30 RX ORDER — PSYLLIUM HUSK 0.4 G
CAPSULE ORAL
Refills: 0 | Status: ACTIVE | COMMUNITY

## 2024-01-30 NOTE — HISTORY OF PRESENT ILLNESS
[de-identified] : 68 yr old with long standing history of chronic rhinitis, post nasal drip , sinus pressure headache - complaints are all year long but increase in spring months. Worsening complaints noted with weather changes.  She has tried  Zyrtec 10 mg qd with partial relief - she took a dose last PM and cannot be ST today - She has tried Flonase but noted loss of sense of smell and stopped - she occasionally uses Afrin few times per month which helps.   At last visit she was begun on azelastine 0.1%  now using qd and feels better - she still occasionally uses Afrin with URI for 3-5 days. She cannot take Flonase secondary to epistasis

## 2024-01-30 NOTE — PHYSICAL EXAM
[Conjunctival Erythema] : no conjunctival erythema [Pale mucosa] : no pale mucosa [Pharyngeal erythema] : no pharyngeal erythema [Clear Rhinorrhea] : no clear rhinorrhea was seen [Wheezing] : no wheezing was heard [Patches] : no patches

## 2024-01-30 NOTE — ASSESSMENT
[FreeTextEntry1] : 68 yr old with chronic rhinitis, post nasal drip with some improvement with use of azelastine and Zyrtec PRN  ST today - small positive tests to mite and tree pollen Suggest increase environmental control for mite Continue use of azelastine nasal spray qd  Follow up on year

## 2024-01-30 NOTE — SOCIAL HISTORY
[FreeTextEntry1] : lives with spouse [Dust Mite Covers] : does not have dust mite covers [Feather Pillows] : does not have feather pillows [Feather Comforter] : does not have a feather comforter [Smokers in Household] : there are no smokers in the home [de-identified] : mina in the basement [de-identified] : area [place] [de-identified] : 1 cat

## 2024-02-06 ENCOUNTER — OUTPATIENT (OUTPATIENT)
Dept: OUTPATIENT SERVICES | Facility: HOSPITAL | Age: 69
LOS: 1 days | Discharge: HOME | End: 2024-02-06

## 2024-02-06 DIAGNOSIS — C18.9 MALIGNANT NEOPLASM OF COLON, UNSPECIFIED: ICD-10-CM

## 2024-02-06 DIAGNOSIS — Z90.49 ACQUIRED ABSENCE OF OTHER SPECIFIED PARTS OF DIGESTIVE TRACT: Chronic | ICD-10-CM

## 2024-02-06 DIAGNOSIS — Z90.710 ACQUIRED ABSENCE OF BOTH CERVIX AND UTERUS: Chronic | ICD-10-CM

## 2024-02-09 ENCOUNTER — APPOINTMENT (OUTPATIENT)
Dept: CT IMAGING | Facility: CLINIC | Age: 69
End: 2024-02-09
Payer: MEDICARE

## 2024-02-09 ENCOUNTER — OUTPATIENT (OUTPATIENT)
Dept: OUTPATIENT SERVICES | Facility: HOSPITAL | Age: 69
LOS: 1 days | End: 2024-02-09
Payer: MEDICARE

## 2024-02-09 DIAGNOSIS — D3A.8 OTHER BENIGN NEUROENDOCRINE TUMORS: ICD-10-CM

## 2024-02-09 DIAGNOSIS — Z90.49 ACQUIRED ABSENCE OF OTHER SPECIFIED PARTS OF DIGESTIVE TRACT: Chronic | ICD-10-CM

## 2024-02-09 DIAGNOSIS — Z90.710 ACQUIRED ABSENCE OF BOTH CERVIX AND UTERUS: Chronic | ICD-10-CM

## 2024-02-09 PROCEDURE — 74177 CT ABD & PELVIS W/CONTRAST: CPT | Mod: 26

## 2024-02-09 PROCEDURE — 71260 CT THORAX DX C+: CPT

## 2024-02-09 PROCEDURE — 71260 CT THORAX DX C+: CPT | Mod: 26

## 2024-02-09 PROCEDURE — 74177 CT ABD & PELVIS W/CONTRAST: CPT

## 2024-02-16 ENCOUNTER — APPOINTMENT (OUTPATIENT)
Dept: HEMATOLOGY ONCOLOGY | Facility: CLINIC | Age: 69
End: 2024-02-16
Payer: MEDICARE

## 2024-02-16 VITALS
OXYGEN SATURATION: 96 % | DIASTOLIC BLOOD PRESSURE: 80 MMHG | WEIGHT: 127.87 LBS | HEART RATE: 98 BPM | TEMPERATURE: 98.2 F | BODY MASS INDEX: 23.83 KG/M2 | HEIGHT: 61.61 IN | SYSTOLIC BLOOD PRESSURE: 172 MMHG | RESPIRATION RATE: 18 BRPM

## 2024-02-16 PROCEDURE — 99214 OFFICE O/P EST MOD 30 MIN: CPT

## 2024-02-16 PROCEDURE — G2211 COMPLEX E/M VISIT ADD ON: CPT

## 2024-02-19 ENCOUNTER — APPOINTMENT (OUTPATIENT)
Dept: RADIOLOGY | Facility: CLINIC | Age: 69
End: 2024-02-19
Payer: MEDICARE

## 2024-02-19 PROCEDURE — 73560 X-RAY EXAM OF KNEE 1 OR 2: CPT | Mod: 50

## 2024-02-19 PROCEDURE — 72100 X-RAY EXAM L-S SPINE 2/3 VWS: CPT

## 2024-02-19 PROCEDURE — 72070 X-RAY EXAM THORAC SPINE 2VWS: CPT

## 2024-02-19 PROCEDURE — 73521 X-RAY EXAM HIPS BI 2 VIEWS: CPT

## 2024-02-19 NOTE — HISTORY OF PRESENT ILLNESS
[Disease: _____________________] : Disease: [unfilled] [AJCC Stage: ____] : AJCC Stage: [unfilled] [de-identified] : in 2015 presented to [place] with lower quadrant pain , nothing noted, placed on surveillance and in March 2016 had appendiceal dilation leading to surgery which revealed :\par  -LAMN with negative margins and no mucin outside the wall \par  -small bowel had low to intermediate grade NE tumor Ki 10% and 2% , 6/17 nodes were + for disease\par  \par  initial followed by Christiano Magdaleno [place] [facility] [place] and placed on surveillance and was OLENA \par  in 2020 last imaging there was OLENA \par  2023 transferred care to [facility]   \par  May 2023 - June 2023 imaging revealed metastatic disease to liver bilobar  [de-identified] : SUKH also noted  [de-identified] : here to review imaging  some increase loose stool;

## 2024-02-19 NOTE — PHYSICAL EXAM
[Fully active, able to carry on all pre-disease performance without restriction] : Status 0 - Fully active, able to carry on all pre-disease performance without restriction [Normal] : affect appropriate [de-identified] : anicteric  [de-identified] : normal respiratory effort, no audible wheeze [de-identified] : reg rate

## 2024-03-05 ENCOUNTER — NON-APPOINTMENT (OUTPATIENT)
Age: 69
End: 2024-03-05

## 2024-03-29 ENCOUNTER — TRANSCRIPTION ENCOUNTER (OUTPATIENT)
Age: 69
End: 2024-03-29

## 2024-05-07 ENCOUNTER — APPOINTMENT (OUTPATIENT)
Dept: CT IMAGING | Facility: CLINIC | Age: 69
End: 2024-05-07
Payer: MEDICARE

## 2024-05-07 ENCOUNTER — OUTPATIENT (OUTPATIENT)
Dept: OUTPATIENT SERVICES | Facility: HOSPITAL | Age: 69
LOS: 1 days | End: 2024-05-07
Payer: MEDICARE

## 2024-05-07 DIAGNOSIS — Z00.8 ENCOUNTER FOR OTHER GENERAL EXAMINATION: ICD-10-CM

## 2024-05-07 DIAGNOSIS — Z90.710 ACQUIRED ABSENCE OF BOTH CERVIX AND UTERUS: Chronic | ICD-10-CM

## 2024-05-07 DIAGNOSIS — Z90.49 ACQUIRED ABSENCE OF OTHER SPECIFIED PARTS OF DIGESTIVE TRACT: Chronic | ICD-10-CM

## 2024-05-07 PROCEDURE — 71260 CT THORAX DX C+: CPT

## 2024-05-07 PROCEDURE — 71260 CT THORAX DX C+: CPT | Mod: 26

## 2024-05-07 PROCEDURE — 74177 CT ABD & PELVIS W/CONTRAST: CPT

## 2024-05-07 PROCEDURE — 74177 CT ABD & PELVIS W/CONTRAST: CPT | Mod: 26

## 2024-05-20 ENCOUNTER — OUTPATIENT (OUTPATIENT)
Dept: OUTPATIENT SERVICES | Facility: HOSPITAL | Age: 69
LOS: 1 days | Discharge: HOME | End: 2024-05-20

## 2024-05-20 DIAGNOSIS — Z90.710 ACQUIRED ABSENCE OF BOTH CERVIX AND UTERUS: Chronic | ICD-10-CM

## 2024-05-20 DIAGNOSIS — C18.9 MALIGNANT NEOPLASM OF COLON, UNSPECIFIED: ICD-10-CM

## 2024-05-20 DIAGNOSIS — Z90.49 ACQUIRED ABSENCE OF OTHER SPECIFIED PARTS OF DIGESTIVE TRACT: Chronic | ICD-10-CM

## 2024-05-24 ENCOUNTER — APPOINTMENT (OUTPATIENT)
Dept: HEMATOLOGY ONCOLOGY | Facility: CLINIC | Age: 69
End: 2024-05-24
Payer: MEDICARE

## 2024-05-24 VITALS
HEIGHT: 62.01 IN | HEART RATE: 76 BPM | SYSTOLIC BLOOD PRESSURE: 146 MMHG | OXYGEN SATURATION: 99 % | DIASTOLIC BLOOD PRESSURE: 86 MMHG | TEMPERATURE: 96.8 F | RESPIRATION RATE: 16 BRPM | BODY MASS INDEX: 23.39 KG/M2 | WEIGHT: 128.75 LBS

## 2024-05-24 DIAGNOSIS — D3A.8 OTHER BENIGN NEUROENDOCRINE TUMORS: ICD-10-CM

## 2024-05-24 PROCEDURE — G2211 COMPLEX E/M VISIT ADD ON: CPT

## 2024-05-24 PROCEDURE — 99214 OFFICE O/P EST MOD 30 MIN: CPT

## 2024-05-28 PROBLEM — D3A.8 NEUROENDOCRINE TUMOR: Status: ACTIVE | Noted: 2023-05-24

## 2024-05-28 NOTE — PHYSICAL EXAM
[Fully active, able to carry on all pre-disease performance without restriction] : Status 0 - Fully active, able to carry on all pre-disease performance without restriction [Normal] : affect appropriate [de-identified] : anicteric  [de-identified] : normal respiratory effort, no audible wheeze [de-identified] : reg rate  [de-identified] : non-disended

## 2024-05-28 NOTE — HISTORY OF PRESENT ILLNESS
[Disease: _____________________] : Disease: [unfilled] [AJCC Stage: ____] : AJCC Stage: [unfilled] [de-identified] : in 2015 presented to [place] with lower quadrant pain , nothing noted, placed on surveillance and in March 2016 had appendiceal dilation leading to surgery which revealed :\par  -LAMN with negative margins and no mucin outside the wall \par  -small bowel had low to intermediate grade NE tumor Ki 10% and 2% , 6/17 nodes were + for disease\par  \par  initial followed by Christiano Magdaleno at [facility] [place] [place] and placed on surveillance and was OLENA \par  in 2020 last imaging there was OLENA \par  2023 transferred care to [facility]   \par  May 2023 - June 2023 imaging 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  She reports an average of 2-3 BM's daily but no new or worsening bowel habits.  She admits to fatigue that she has noticed in the last couple of months.  She has been caring for her two small grandchildren, which she is able to do, but feels very tired after this.  She denies anorexia, weight loss, dizziness, HA, syncope, CP, SOB, BARTLETT, cough, N/V, abdominal pain, swelling.

## 2024-11-05 ENCOUNTER — APPOINTMENT (OUTPATIENT)
Dept: CT IMAGING | Facility: CLINIC | Age: 69
End: 2024-11-05
Payer: MEDICARE

## 2024-11-05 ENCOUNTER — OUTPATIENT (OUTPATIENT)
Dept: OUTPATIENT SERVICES | Facility: HOSPITAL | Age: 69
LOS: 1 days | End: 2024-11-05
Payer: MEDICARE

## 2024-11-05 DIAGNOSIS — Z00.8 ENCOUNTER FOR OTHER GENERAL EXAMINATION: ICD-10-CM

## 2024-11-05 DIAGNOSIS — Z90.710 ACQUIRED ABSENCE OF BOTH CERVIX AND UTERUS: Chronic | ICD-10-CM

## 2024-11-05 DIAGNOSIS — Z90.49 ACQUIRED ABSENCE OF OTHER SPECIFIED PARTS OF DIGESTIVE TRACT: Chronic | ICD-10-CM

## 2024-11-05 PROCEDURE — 74177 CT ABD & PELVIS W/CONTRAST: CPT

## 2024-11-05 PROCEDURE — 74177 CT ABD & PELVIS W/CONTRAST: CPT | Mod: 26

## 2024-11-05 PROCEDURE — 71260 CT THORAX DX C+: CPT | Mod: 26

## 2024-11-05 PROCEDURE — 71260 CT THORAX DX C+: CPT

## 2024-11-22 ENCOUNTER — OUTPATIENT (OUTPATIENT)
Dept: OUTPATIENT SERVICES | Facility: HOSPITAL | Age: 69
LOS: 1 days | End: 2024-11-22
Payer: MEDICARE

## 2024-11-22 ENCOUNTER — APPOINTMENT (OUTPATIENT)
Dept: MAMMOGRAPHY | Facility: CLINIC | Age: 69
End: 2024-11-22
Payer: MEDICARE

## 2024-11-22 ENCOUNTER — APPOINTMENT (OUTPATIENT)
Dept: ULTRASOUND IMAGING | Facility: CLINIC | Age: 69
End: 2024-11-22
Payer: MEDICARE

## 2024-11-22 ENCOUNTER — APPOINTMENT (OUTPATIENT)
Dept: RADIOLOGY | Facility: CLINIC | Age: 69
End: 2024-11-22
Payer: MEDICARE

## 2024-11-22 DIAGNOSIS — Z90.49 ACQUIRED ABSENCE OF OTHER SPECIFIED PARTS OF DIGESTIVE TRACT: Chronic | ICD-10-CM

## 2024-11-22 DIAGNOSIS — Z00.00 ENCOUNTER FOR GENERAL ADULT MEDICAL EXAMINATION WITHOUT ABNORMAL FINDINGS: ICD-10-CM

## 2024-11-22 DIAGNOSIS — Z90.710 ACQUIRED ABSENCE OF BOTH CERVIX AND UTERUS: Chronic | ICD-10-CM

## 2024-11-22 PROCEDURE — 77080 DXA BONE DENSITY AXIAL: CPT | Mod: 26

## 2024-11-22 PROCEDURE — 76641 ULTRASOUND BREAST COMPLETE: CPT | Mod: 26,50

## 2024-11-22 PROCEDURE — 77080 DXA BONE DENSITY AXIAL: CPT

## 2024-11-22 PROCEDURE — 77063 BREAST TOMOSYNTHESIS BI: CPT | Mod: 26

## 2024-11-22 PROCEDURE — 77067 SCR MAMMO BI INCL CAD: CPT

## 2024-11-22 PROCEDURE — 77067 SCR MAMMO BI INCL CAD: CPT | Mod: 26

## 2024-11-22 PROCEDURE — 77063 BREAST TOMOSYNTHESIS BI: CPT

## 2024-11-22 PROCEDURE — 76641 ULTRASOUND BREAST COMPLETE: CPT

## 2024-12-04 ENCOUNTER — OUTPATIENT (OUTPATIENT)
Dept: OUTPATIENT SERVICES | Facility: HOSPITAL | Age: 69
LOS: 1 days | Discharge: HOME | End: 2024-12-04

## 2024-12-04 DIAGNOSIS — Z90.710 ACQUIRED ABSENCE OF BOTH CERVIX AND UTERUS: Chronic | ICD-10-CM

## 2024-12-04 DIAGNOSIS — C18.9 MALIGNANT NEOPLASM OF COLON, UNSPECIFIED: ICD-10-CM

## 2024-12-04 DIAGNOSIS — Z90.49 ACQUIRED ABSENCE OF OTHER SPECIFIED PARTS OF DIGESTIVE TRACT: Chronic | ICD-10-CM

## 2024-12-06 ENCOUNTER — APPOINTMENT (OUTPATIENT)
Dept: HEMATOLOGY ONCOLOGY | Facility: CLINIC | Age: 69
End: 2024-12-06
Payer: MEDICARE

## 2024-12-06 VITALS
DIASTOLIC BLOOD PRESSURE: 87 MMHG | SYSTOLIC BLOOD PRESSURE: 144 MMHG | RESPIRATION RATE: 16 BRPM | HEIGHT: 61.85 IN | BODY MASS INDEX: 23.99 KG/M2 | WEIGHT: 130.38 LBS | TEMPERATURE: 97.7 F | OXYGEN SATURATION: 98 % | HEART RATE: 87 BPM

## 2024-12-06 DIAGNOSIS — D3A.8 OTHER BENIGN NEUROENDOCRINE TUMORS: ICD-10-CM

## 2024-12-06 PROCEDURE — 99214 OFFICE O/P EST MOD 30 MIN: CPT

## 2024-12-06 PROCEDURE — G2211 COMPLEX E/M VISIT ADD ON: CPT

## 2025-07-22 ENCOUNTER — APPOINTMENT (OUTPATIENT)
Dept: CT IMAGING | Facility: CLINIC | Age: 70
End: 2025-07-22
Payer: MEDICARE

## 2025-07-22 ENCOUNTER — OUTPATIENT (OUTPATIENT)
Dept: OUTPATIENT SERVICES | Facility: HOSPITAL | Age: 70
LOS: 1 days | End: 2025-07-22
Payer: MEDICARE

## 2025-07-22 DIAGNOSIS — Z90.710 ACQUIRED ABSENCE OF BOTH CERVIX AND UTERUS: Chronic | ICD-10-CM

## 2025-07-22 DIAGNOSIS — Z90.49 ACQUIRED ABSENCE OF OTHER SPECIFIED PARTS OF DIGESTIVE TRACT: Chronic | ICD-10-CM

## 2025-07-22 PROCEDURE — 71260 CT THORAX DX C+: CPT

## 2025-07-22 PROCEDURE — 74177 CT ABD & PELVIS W/CONTRAST: CPT

## 2025-07-22 PROCEDURE — 74177 CT ABD & PELVIS W/CONTRAST: CPT | Mod: 26

## 2025-07-22 PROCEDURE — 71260 CT THORAX DX C+: CPT | Mod: 26

## 2025-08-13 ENCOUNTER — NON-APPOINTMENT (OUTPATIENT)
Age: 70
End: 2025-08-13

## 2025-08-13 ENCOUNTER — RESULT REVIEW (OUTPATIENT)
Age: 70
End: 2025-08-13

## 2025-08-13 ENCOUNTER — APPOINTMENT (OUTPATIENT)
Dept: HEMATOLOGY ONCOLOGY | Facility: CLINIC | Age: 70
End: 2025-08-13
Payer: MEDICARE

## 2025-08-13 VITALS
OXYGEN SATURATION: 98 % | RESPIRATION RATE: 17 BRPM | HEART RATE: 74 BPM | WEIGHT: 130.07 LBS | BODY MASS INDEX: 23.91 KG/M2 | DIASTOLIC BLOOD PRESSURE: 77 MMHG | SYSTOLIC BLOOD PRESSURE: 150 MMHG | TEMPERATURE: 98 F

## 2025-08-13 DIAGNOSIS — D3A.8 OTHER BENIGN NEUROENDOCRINE TUMORS: ICD-10-CM

## 2025-08-13 PROCEDURE — 99214 OFFICE O/P EST MOD 30 MIN: CPT

## 2025-08-13 PROCEDURE — G2211 COMPLEX E/M VISIT ADD ON: CPT

## 2025-08-14 LAB
ALBUMIN SERPL ELPH-MCNC: 4.6 G/DL
ALP BLD-CCNC: 112 U/L
ALT SERPL-CCNC: 22 U/L
ANION GAP SERPL CALC-SCNC: 15 MMOL/L
AST SERPL-CCNC: 25 U/L
BILIRUB SERPL-MCNC: 0.3 MG/DL
BUN SERPL-MCNC: 18 MG/DL
CALCIUM SERPL-MCNC: 9.3 MG/DL
CHLORIDE SERPL-SCNC: 100 MMOL/L
CO2 SERPL-SCNC: 24 MMOL/L
CREAT SERPL-MCNC: 0.67 MG/DL
EGFRCR SERPLBLD CKD-EPI 2021: 94 ML/MIN/1.73M2
GLUCOSE SERPL-MCNC: 97 MG/DL
POTASSIUM SERPL-SCNC: 4.3 MMOL/L
PROT SERPL-MCNC: 7.3 G/DL
SODIUM SERPL-SCNC: 139 MMOL/L

## 2025-08-22 LAB
MISCELLANEOUS TEST: NORMAL
PROC NAME: NORMAL

## 2025-09-05 ENCOUNTER — APPOINTMENT (OUTPATIENT)
Dept: INFUSION THERAPY | Facility: HOSPITAL | Age: 70
End: 2025-09-05

## 2025-09-08 ENCOUNTER — NON-APPOINTMENT (OUTPATIENT)
Age: 70
End: 2025-09-08

## (undated) DEVICE — SOL IRR POUR H2O 1000ML

## (undated) DEVICE — DRSG TEGADERM 2.5X3"

## (undated) DEVICE — TRACHEOSTOMY TRAY PEDIATRIC DISP

## (undated) DEVICE — DRAPE INSTRUMENT POUCH 6.75" X 11"

## (undated) DEVICE — NDL HYPO REGULAR BEVEL 25G X 1.5" (BLUE)

## (undated) DEVICE — PROTECTOR HEEL / ELBOW FLUFFY

## (undated) DEVICE — SUT SOFSILK 2-0 BLACK 12X18 PRE-CUT

## (undated) DEVICE — DRSG TELFA 3 X 8

## (undated) DEVICE — STAPLER SKIN VISI-STAT 35 WIDE

## (undated) DEVICE — GLV 7.5 PROTEXIS (WHITE)

## (undated) DEVICE — ELCTR MONOPOLAR STIMULATOR PROBE FLUSH-TIP

## (undated) DEVICE — DRAIN RESERVOIR FOR JACKSON PRATT 100CC CARDINAL

## (undated) DEVICE — SUT MONOCRYL 4-0 27" PS-2 UNDYED

## (undated) DEVICE — SUT POLYSORB 3-0 60" REEL

## (undated) DEVICE — SUT SOFSILK 3-0 12X18" TIES

## (undated) DEVICE — VISITEC 4X4

## (undated) DEVICE — SUT POLYSORB 2-0 60" REEL

## (undated) DEVICE — POSITIONER FOAM SLOTTED HEAD CRADLE (PINK)

## (undated) DEVICE — PACK MINOR WITH LAP

## (undated) DEVICE — DRAPE 3/4 SHEET 52X76"

## (undated) DEVICE — DRAPE MAGNETIC INSTRUMENT MEDIUM

## (undated) DEVICE — VENODYNE/SCD SLEEVE CALF LARGE

## (undated) DEVICE — VENODYNE/SCD SLEEVE CALF MEDIUM

## (undated) DEVICE — MARKING PEN W RULER

## (undated) DEVICE — BLADE SCALPEL SAFETYLOCK #15

## (undated) DEVICE — DRAIN BLAKE 10FR ROUND

## (undated) DEVICE — SUT SOFSILK 2-0 18" C-15

## (undated) DEVICE — POSITIONER FOAM EGG CRATE ULNAR 2PCS (PINK)

## (undated) DEVICE — SUT POLYSORB 2-0 30" V-20 UNDYED

## (undated) DEVICE — GLV 7.5 PROTEXIS (BLUE)

## (undated) DEVICE — SUT POLYSORB 3-0 30" V-20 UNDYED

## (undated) DEVICE — SOL IRR POUR NS 0.9% 1000ML

## (undated) DEVICE — DRAPE THYROID 77" X 123"

## (undated) DEVICE — LIGASURE SMALL JAW

## (undated) DEVICE — ELCTR BOVIE TIP BLADE INSULATED 2.75" EDGE

## (undated) DEVICE — DRAPE TOWEL BLUE 17" X 24"

## (undated) DEVICE — SPONGE PEANUT AUTO COUNT

## (undated) DEVICE — WARMING BLANKET LOWER ADULT

## (undated) DEVICE — TUBING FOR SMOKE EVACUATOR (PURPLE END)

## (undated) DEVICE — DRSG STERISTRIPS 0.5 X 4"

## (undated) DEVICE — PREP ALCOHOL PAD MED

## (undated) DEVICE — SPECIMEN CONTAINER 4OZ

## (undated) DEVICE — DRSG MASTISOL

## (undated) DEVICE — PREP BETADINE SPONGE STICKS